# Patient Record
Sex: FEMALE | Race: WHITE | NOT HISPANIC OR LATINO | Employment: OTHER
[De-identification: names, ages, dates, MRNs, and addresses within clinical notes are randomized per-mention and may not be internally consistent; named-entity substitution may affect disease eponyms.]

---

## 2022-02-27 DIAGNOSIS — Z11.59 ENCOUNTER FOR SCREENING FOR OTHER VIRAL DISEASES: Primary | ICD-10-CM

## 2022-03-31 RX ORDER — ROSUVASTATIN CALCIUM 40 MG/1
40 TABLET, COATED ORAL DAILY
COMMUNITY

## 2022-03-31 RX ORDER — CELECOXIB 200 MG/1
200 CAPSULE ORAL EVERY OTHER DAY
COMMUNITY

## 2022-03-31 RX ORDER — MULTIPLE VITAMINS W/ MINERALS TAB 9MG-400MCG
1 TAB ORAL DAILY
COMMUNITY

## 2022-03-31 RX ORDER — CYANOCOBALAMIN 1000 UG/ML
1 INJECTION, SOLUTION INTRAMUSCULAR; SUBCUTANEOUS
COMMUNITY

## 2022-03-31 RX ORDER — ALPRAZOLAM 0.5 MG
0.5 TABLET ORAL PRN
COMMUNITY

## 2022-03-31 RX ORDER — GLIPIZIDE 5 MG/1
5 TABLET, FILM COATED, EXTENDED RELEASE ORAL DAILY
COMMUNITY

## 2022-03-31 RX ORDER — CHLORAL HYDRATE 500 MG
2 CAPSULE ORAL DAILY
COMMUNITY

## 2022-03-31 RX ORDER — LISINOPRIL 10 MG/1
10 TABLET ORAL DAILY
COMMUNITY

## 2022-03-31 RX ORDER — GABAPENTIN 300 MG/1
600 CAPSULE ORAL AT BEDTIME
COMMUNITY

## 2022-03-31 RX ORDER — FLUOXETINE 40 MG/1
40 CAPSULE ORAL DAILY
COMMUNITY

## 2022-04-03 ENCOUNTER — HEALTH MAINTENANCE LETTER (OUTPATIENT)
Age: 72
End: 2022-04-03

## 2022-04-12 NOTE — PHARMACY-ADMISSION MEDICATION HISTORY
Admission medication history interview status for this patient is complete. See [facility] admission navigator for allergy information, prior to admission medications and immunization status.       PTA meds completed by pre-admitting nurse Yoly Barlow and reviewed by pharmacy       Prior to Admission medications    Medication Sig Last Dose Taking? Auth Provider   ALPRAZolam (XANAX) 0.5 MG tablet Take 0.5 mg by mouth as needed for anxiety As needed for air travel  Yes Reported, Patient   Calcium-Magnesium-Vitamin D (CALCIUM 1200+D3 PO) Take 1 tablet by mouth daily  Yes Reported, Patient   celecoxib (CELEBREX) 200 MG capsule Take 200 mg by mouth every other day  Yes Reported, Patient   cyanocobalamin (CYANOCOBALAMIN) 1000 MCG/ML injection Inject 1 mL into the muscle every 30 days  Yes Reported, Patient   ferrous fumarate 65 mg, [place]. FE,-Vitamin C 125 mg (VITRON C)  MG TABS tablet Take 1 tablet by mouth daily  Yes Reported, Patient   fish oil-omega-3 fatty acids 1000 MG capsule Take 2 g by mouth daily  Yes Reported, Patient   FLUoxetine (PROZAC) 40 MG capsule Take 40 mg by mouth daily  Yes Reported, Patient   gabapentin (NEURONTIN) 300 MG capsule Take 600 mg by mouth At Bedtime  Yes Reported, Patient   glipiZIDE (GLUCOTROL XL) 5 MG 24 hr tablet Take 5 mg by mouth daily  Yes Reported, Patient   lisinopril (ZESTRIL) 10 MG tablet Take 10 mg by mouth daily  Yes Reported, Patient   magnesium oxide 200 MG TABS Take 1 tablet by mouth daily  Yes Reported, Patient   metFORMIN (GLUCOPHAGE) 500 MG tablet Take 1,000 mg by mouth 2 times daily (with meals)  Yes Reported, Patient   multivitamin w/minerals (MULTI-VITAMIN) tablet Take 1 tablet by mouth daily  Yes Reported, Patient   rosuvastatin (CRESTOR) 40 MG tablet Take 40 mg by mouth daily  Yes Reported, Patient

## 2022-04-13 ENCOUNTER — APPOINTMENT (OUTPATIENT)
Dept: GENERAL RADIOLOGY | Facility: CLINIC | Age: 72
DRG: 470 | End: 2022-04-13
Attending: PHYSICIAN ASSISTANT
Payer: COMMERCIAL

## 2022-04-13 ENCOUNTER — ANESTHESIA EVENT (OUTPATIENT)
Dept: SURGERY | Facility: CLINIC | Age: 72
DRG: 470 | End: 2022-04-13
Payer: COMMERCIAL

## 2022-04-13 ENCOUNTER — HOSPITAL ENCOUNTER (INPATIENT)
Facility: CLINIC | Age: 72
LOS: 1 days | Discharge: NURSING, REHAB OR LONG-TERM CARE | DRG: 470 | End: 2022-04-14
Attending: ORTHOPAEDIC SURGERY | Admitting: ORTHOPAEDIC SURGERY
Payer: COMMERCIAL

## 2022-04-13 ENCOUNTER — ANESTHESIA (OUTPATIENT)
Dept: SURGERY | Facility: CLINIC | Age: 72
DRG: 470 | End: 2022-04-13
Payer: COMMERCIAL

## 2022-04-13 ENCOUNTER — APPOINTMENT (OUTPATIENT)
Dept: PHYSICAL THERAPY | Facility: CLINIC | Age: 72
DRG: 470 | End: 2022-04-13
Attending: PHYSICIAN ASSISTANT
Payer: COMMERCIAL

## 2022-04-13 DIAGNOSIS — Z96.641 STATUS POST TOTAL HIP REPLACEMENT, RIGHT: Primary | ICD-10-CM

## 2022-04-13 PROBLEM — Z96.649 S/P TOTAL HIP ARTHROPLASTY: Status: ACTIVE | Noted: 2022-04-13

## 2022-04-13 LAB
GLUCOSE BLDC GLUCOMTR-MCNC: 118 MG/DL (ref 70–99)
GLUCOSE BLDC GLUCOMTR-MCNC: 166 MG/DL (ref 70–99)
GLUCOSE BLDC GLUCOMTR-MCNC: 176 MG/DL (ref 70–99)
GLUCOSE BLDC GLUCOMTR-MCNC: 195 MG/DL (ref 70–99)
GLUCOSE BLDC GLUCOMTR-MCNC: 208 MG/DL (ref 70–99)

## 2022-04-13 PROCEDURE — 258N000003 HC RX IP 258 OP 636: Performed by: PHYSICIAN ASSISTANT

## 2022-04-13 PROCEDURE — 250N000011 HC RX IP 250 OP 636: Performed by: ORTHOPAEDIC SURGERY

## 2022-04-13 PROCEDURE — 250N000011 HC RX IP 250 OP 636: Performed by: PHYSICIAN ASSISTANT

## 2022-04-13 PROCEDURE — 258N000001 HC RX 258: Performed by: ORTHOPAEDIC SURGERY

## 2022-04-13 PROCEDURE — 97530 THERAPEUTIC ACTIVITIES: CPT | Mod: GP | Performed by: PHYSICAL THERAPIST

## 2022-04-13 PROCEDURE — 258N000003 HC RX IP 258 OP 636: Performed by: NURSE ANESTHETIST, CERTIFIED REGISTERED

## 2022-04-13 PROCEDURE — 999N000141 HC STATISTIC PRE-PROCEDURE NURSING ASSESSMENT: Performed by: ORTHOPAEDIC SURGERY

## 2022-04-13 PROCEDURE — 370N000017 HC ANESTHESIA TECHNICAL FEE, PER MIN: Performed by: ORTHOPAEDIC SURGERY

## 2022-04-13 PROCEDURE — 250N000011 HC RX IP 250 OP 636: Performed by: NURSE ANESTHETIST, CERTIFIED REGISTERED

## 2022-04-13 PROCEDURE — 99222 1ST HOSP IP/OBS MODERATE 55: CPT | Performed by: INTERNAL MEDICINE

## 2022-04-13 PROCEDURE — 250N000011 HC RX IP 250 OP 636: Performed by: ANESTHESIOLOGY

## 2022-04-13 PROCEDURE — 82962 GLUCOSE BLOOD TEST: CPT

## 2022-04-13 PROCEDURE — 272N000001 HC OR GENERAL SUPPLY STERILE: Performed by: ORTHOPAEDIC SURGERY

## 2022-04-13 PROCEDURE — 97161 PT EVAL LOW COMPLEX 20 MIN: CPT | Mod: GP | Performed by: PHYSICAL THERAPIST

## 2022-04-13 PROCEDURE — 360N000077 HC SURGERY LEVEL 4, PER MIN: Performed by: ORTHOPAEDIC SURGERY

## 2022-04-13 PROCEDURE — 710N000009 HC RECOVERY PHASE 1, LEVEL 1, PER MIN: Performed by: ORTHOPAEDIC SURGERY

## 2022-04-13 PROCEDURE — 250N000009 HC RX 250: Performed by: NURSE ANESTHETIST, CERTIFIED REGISTERED

## 2022-04-13 PROCEDURE — 97116 GAIT TRAINING THERAPY: CPT | Mod: GP | Performed by: PHYSICAL THERAPIST

## 2022-04-13 PROCEDURE — 999N000065 XR PELVIS AD HIP PORTABLE RIGHT 1 VIEW

## 2022-04-13 PROCEDURE — 120N000001 HC R&B MED SURG/OB

## 2022-04-13 PROCEDURE — C1776 JOINT DEVICE (IMPLANTABLE): HCPCS | Performed by: ORTHOPAEDIC SURGERY

## 2022-04-13 PROCEDURE — 250N000013 HC RX MED GY IP 250 OP 250 PS 637: Performed by: PHYSICIAN ASSISTANT

## 2022-04-13 PROCEDURE — 0SR903A REPLACEMENT OF RIGHT HIP JOINT WITH CERAMIC SYNTHETIC SUBSTITUTE, UNCEMENTED, OPEN APPROACH: ICD-10-PCS | Performed by: ORTHOPAEDIC SURGERY

## 2022-04-13 PROCEDURE — 272N000002 HC OR SUPPLY OTHER OPNP: Performed by: ORTHOPAEDIC SURGERY

## 2022-04-13 DEVICE — TRIDENT II TRITANIUM CLUSTERHOLE 52E: Type: IMPLANTABLE DEVICE | Site: HIP | Status: FUNCTIONAL

## 2022-04-13 DEVICE — INSERT ACE 36MM 0DEG X3 723-00-36E: Type: IMPLANTABLE DEVICE | Site: HIP | Status: FUNCTIONAL

## 2022-04-13 DEVICE — IMP STEM FEMORAL HIP STRK ACCOLADE II 132DEG SZ 5 6720-0535: Type: IMPLANTABLE DEVICE | Site: HIP | Status: FUNCTIONAL

## 2022-04-13 DEVICE — IMP HEAD FEMORAL STRK BIOLOX DELTA CERAMIC 36MM +0MM: Type: IMPLANTABLE DEVICE | Site: HIP | Status: FUNCTIONAL

## 2022-04-13 RX ORDER — LIDOCAINE 40 MG/G
CREAM TOPICAL
Status: DISCONTINUED | OUTPATIENT
Start: 2022-04-13 | End: 2022-04-14 | Stop reason: HOSPADM

## 2022-04-13 RX ORDER — HYDROMORPHONE HCL IN WATER/PF 6 MG/30 ML
0.2 PATIENT CONTROLLED ANALGESIA SYRINGE INTRAVENOUS
Status: DISCONTINUED | OUTPATIENT
Start: 2022-04-13 | End: 2022-04-14 | Stop reason: HOSPADM

## 2022-04-13 RX ORDER — AMOXICILLIN 250 MG
1 CAPSULE ORAL 2 TIMES DAILY
Status: DISCONTINUED | OUTPATIENT
Start: 2022-04-13 | End: 2022-04-14 | Stop reason: HOSPADM

## 2022-04-13 RX ORDER — OXYCODONE HYDROCHLORIDE 5 MG/1
5-10 TABLET ORAL EVERY 4 HOURS PRN
Qty: 20 TABLET | Refills: 0 | Status: SHIPPED | OUTPATIENT
Start: 2022-04-13 | End: 2022-04-14

## 2022-04-13 RX ORDER — KETOROLAC TROMETHAMINE 30 MG/ML
INJECTION, SOLUTION INTRAMUSCULAR; INTRAVENOUS PRN
Status: DISCONTINUED | OUTPATIENT
Start: 2022-04-13 | End: 2022-04-13

## 2022-04-13 RX ORDER — TRAMADOL HYDROCHLORIDE 50 MG/1
50 TABLET ORAL EVERY 6 HOURS PRN
Status: DISCONTINUED | OUTPATIENT
Start: 2022-04-13 | End: 2022-04-14 | Stop reason: HOSPADM

## 2022-04-13 RX ORDER — LISINOPRIL 10 MG/1
10 TABLET ORAL DAILY
Status: DISCONTINUED | OUTPATIENT
Start: 2022-04-13 | End: 2022-04-14 | Stop reason: HOSPADM

## 2022-04-13 RX ORDER — NALOXONE HYDROCHLORIDE 0.4 MG/ML
0.2 INJECTION, SOLUTION INTRAMUSCULAR; INTRAVENOUS; SUBCUTANEOUS
Status: DISCONTINUED | OUTPATIENT
Start: 2022-04-13 | End: 2022-04-14 | Stop reason: HOSPADM

## 2022-04-13 RX ORDER — SODIUM CHLORIDE, SODIUM LACTATE, POTASSIUM CHLORIDE, CALCIUM CHLORIDE 600; 310; 30; 20 MG/100ML; MG/100ML; MG/100ML; MG/100ML
INJECTION, SOLUTION INTRAVENOUS CONTINUOUS
Status: DISCONTINUED | OUTPATIENT
Start: 2022-04-13 | End: 2022-04-14 | Stop reason: HOSPADM

## 2022-04-13 RX ORDER — FENTANYL CITRATE 50 UG/ML
25 INJECTION, SOLUTION INTRAMUSCULAR; INTRAVENOUS EVERY 5 MIN PRN
Status: DISCONTINUED | OUTPATIENT
Start: 2022-04-13 | End: 2022-04-13 | Stop reason: HOSPADM

## 2022-04-13 RX ORDER — EPHEDRINE SULFATE 50 MG/ML
INJECTION, SOLUTION INTRAVENOUS PRN
Status: DISCONTINUED | OUTPATIENT
Start: 2022-04-13 | End: 2022-04-13

## 2022-04-13 RX ORDER — DEXTROSE MONOHYDRATE 25 G/50ML
25-50 INJECTION, SOLUTION INTRAVENOUS
Status: DISCONTINUED | OUTPATIENT
Start: 2022-04-13 | End: 2022-04-14 | Stop reason: HOSPADM

## 2022-04-13 RX ORDER — CEFAZOLIN SODIUM/WATER 2 G/20 ML
2 SYRINGE (ML) INTRAVENOUS
Status: COMPLETED | OUTPATIENT
Start: 2022-04-13 | End: 2022-04-13

## 2022-04-13 RX ORDER — GLYCOPYRROLATE 0.2 MG/ML
INJECTION, SOLUTION INTRAMUSCULAR; INTRAVENOUS PRN
Status: DISCONTINUED | OUTPATIENT
Start: 2022-04-13 | End: 2022-04-13

## 2022-04-13 RX ORDER — PROCHLORPERAZINE MALEATE 5 MG
5 TABLET ORAL EVERY 6 HOURS PRN
Status: DISCONTINUED | OUTPATIENT
Start: 2022-04-13 | End: 2022-04-14 | Stop reason: HOSPADM

## 2022-04-13 RX ORDER — HYDRALAZINE HYDROCHLORIDE 20 MG/ML
2.5-5 INJECTION INTRAMUSCULAR; INTRAVENOUS EVERY 10 MIN PRN
Status: DISCONTINUED | OUTPATIENT
Start: 2022-04-13 | End: 2022-04-13 | Stop reason: HOSPADM

## 2022-04-13 RX ORDER — POLYETHYLENE GLYCOL 3350 17 G/17G
17 POWDER, FOR SOLUTION ORAL DAILY
Status: DISCONTINUED | OUTPATIENT
Start: 2022-04-14 | End: 2022-04-14 | Stop reason: HOSPADM

## 2022-04-13 RX ORDER — FENTANYL CITRATE 50 UG/ML
INJECTION, SOLUTION INTRAMUSCULAR; INTRAVENOUS PRN
Status: DISCONTINUED | OUTPATIENT
Start: 2022-04-13 | End: 2022-04-13

## 2022-04-13 RX ORDER — ONDANSETRON 2 MG/ML
4 INJECTION INTRAMUSCULAR; INTRAVENOUS EVERY 30 MIN PRN
Status: DISCONTINUED | OUTPATIENT
Start: 2022-04-13 | End: 2022-04-13 | Stop reason: HOSPADM

## 2022-04-13 RX ORDER — HYDROMORPHONE HCL IN WATER/PF 6 MG/30 ML
0.2 PATIENT CONTROLLED ANALGESIA SYRINGE INTRAVENOUS EVERY 5 MIN PRN
Status: DISCONTINUED | OUTPATIENT
Start: 2022-04-13 | End: 2022-04-13 | Stop reason: HOSPADM

## 2022-04-13 RX ORDER — NALOXONE HYDROCHLORIDE 0.4 MG/ML
0.4 INJECTION, SOLUTION INTRAMUSCULAR; INTRAVENOUS; SUBCUTANEOUS
Status: DISCONTINUED | OUTPATIENT
Start: 2022-04-13 | End: 2022-04-14 | Stop reason: HOSPADM

## 2022-04-13 RX ORDER — TRAMADOL HYDROCHLORIDE 50 MG/1
50 TABLET ORAL EVERY 6 HOURS PRN
Qty: 30 TABLET | Refills: 0 | Status: SHIPPED | OUTPATIENT
Start: 2022-04-13 | End: 2022-04-14

## 2022-04-13 RX ORDER — CELECOXIB 200 MG/1
200 CAPSULE ORAL EVERY OTHER DAY
Status: DISCONTINUED | OUTPATIENT
Start: 2022-04-13 | End: 2022-04-14 | Stop reason: HOSPADM

## 2022-04-13 RX ORDER — ACETAMINOPHEN 325 MG/1
650 TABLET ORAL EVERY 4 HOURS PRN
Qty: 100 TABLET | Refills: 0 | Status: SHIPPED | OUTPATIENT
Start: 2022-04-13

## 2022-04-13 RX ORDER — SODIUM CHLORIDE, SODIUM LACTATE, POTASSIUM CHLORIDE, CALCIUM CHLORIDE 600; 310; 30; 20 MG/100ML; MG/100ML; MG/100ML; MG/100ML
INJECTION, SOLUTION INTRAVENOUS CONTINUOUS PRN
Status: DISCONTINUED | OUTPATIENT
Start: 2022-04-13 | End: 2022-04-13

## 2022-04-13 RX ORDER — AMOXICILLIN 250 MG
1-2 CAPSULE ORAL 2 TIMES DAILY
Qty: 30 TABLET | Refills: 0 | Status: SHIPPED | OUTPATIENT
Start: 2022-04-13

## 2022-04-13 RX ORDER — HYDROXYZINE HYDROCHLORIDE 10 MG/1
10 TABLET, FILM COATED ORAL EVERY 6 HOURS PRN
Status: DISCONTINUED | OUTPATIENT
Start: 2022-04-13 | End: 2022-04-14 | Stop reason: HOSPADM

## 2022-04-13 RX ORDER — ACETAMINOPHEN 325 MG/1
975 TABLET ORAL EVERY 8 HOURS
Status: DISCONTINUED | OUTPATIENT
Start: 2022-04-13 | End: 2022-04-14 | Stop reason: HOSPADM

## 2022-04-13 RX ORDER — OXYCODONE HYDROCHLORIDE 5 MG/1
10 TABLET ORAL EVERY 4 HOURS PRN
Status: DISCONTINUED | OUTPATIENT
Start: 2022-04-13 | End: 2022-04-14 | Stop reason: HOSPADM

## 2022-04-13 RX ORDER — OXYCODONE HYDROCHLORIDE 5 MG/1
5 TABLET ORAL EVERY 4 HOURS PRN
Status: DISCONTINUED | OUTPATIENT
Start: 2022-04-13 | End: 2022-04-14 | Stop reason: HOSPADM

## 2022-04-13 RX ORDER — TRANEXAMIC ACID 650 MG/1
1950 TABLET ORAL ONCE
Status: COMPLETED | OUTPATIENT
Start: 2022-04-13 | End: 2022-04-13

## 2022-04-13 RX ORDER — DEXAMETHASONE SODIUM PHOSPHATE 4 MG/ML
INJECTION, SOLUTION INTRA-ARTICULAR; INTRALESIONAL; INTRAMUSCULAR; INTRAVENOUS; SOFT TISSUE PRN
Status: DISCONTINUED | OUTPATIENT
Start: 2022-04-13 | End: 2022-04-13

## 2022-04-13 RX ORDER — NICOTINE POLACRILEX 4 MG
15-30 LOZENGE BUCCAL
Status: DISCONTINUED | OUTPATIENT
Start: 2022-04-13 | End: 2022-04-14 | Stop reason: HOSPADM

## 2022-04-13 RX ORDER — ONDANSETRON 4 MG/1
4-8 TABLET, ORALLY DISINTEGRATING ORAL EVERY 8 HOURS PRN
Qty: 10 TABLET | Refills: 0 | Status: SHIPPED | OUTPATIENT
Start: 2022-04-13

## 2022-04-13 RX ORDER — GABAPENTIN 300 MG/1
600 CAPSULE ORAL AT BEDTIME
Status: DISCONTINUED | OUTPATIENT
Start: 2022-04-13 | End: 2022-04-14 | Stop reason: HOSPADM

## 2022-04-13 RX ORDER — HYDROMORPHONE HCL IN WATER/PF 6 MG/30 ML
0.4 PATIENT CONTROLLED ANALGESIA SYRINGE INTRAVENOUS
Status: DISCONTINUED | OUTPATIENT
Start: 2022-04-13 | End: 2022-04-14 | Stop reason: HOSPADM

## 2022-04-13 RX ORDER — LIDOCAINE 40 MG/G
CREAM TOPICAL
Status: DISCONTINUED | OUTPATIENT
Start: 2022-04-13 | End: 2022-04-13 | Stop reason: HOSPADM

## 2022-04-13 RX ORDER — PROPOFOL 10 MG/ML
INJECTION, EMULSION INTRAVENOUS PRN
Status: DISCONTINUED | OUTPATIENT
Start: 2022-04-13 | End: 2022-04-13

## 2022-04-13 RX ORDER — ONDANSETRON 4 MG/1
4 TABLET, ORALLY DISINTEGRATING ORAL EVERY 6 HOURS PRN
Status: DISCONTINUED | OUTPATIENT
Start: 2022-04-13 | End: 2022-04-14 | Stop reason: HOSPADM

## 2022-04-13 RX ORDER — ONDANSETRON 4 MG/1
4 TABLET, ORALLY DISINTEGRATING ORAL EVERY 30 MIN PRN
Status: DISCONTINUED | OUTPATIENT
Start: 2022-04-13 | End: 2022-04-13 | Stop reason: HOSPADM

## 2022-04-13 RX ORDER — NEOSTIGMINE METHYLSULFATE 1 MG/ML
VIAL (ML) INJECTION PRN
Status: DISCONTINUED | OUTPATIENT
Start: 2022-04-13 | End: 2022-04-13

## 2022-04-13 RX ORDER — ONDANSETRON 2 MG/ML
4 INJECTION INTRAMUSCULAR; INTRAVENOUS EVERY 6 HOURS PRN
Status: DISCONTINUED | OUTPATIENT
Start: 2022-04-13 | End: 2022-04-14 | Stop reason: HOSPADM

## 2022-04-13 RX ORDER — HYDROXYZINE HYDROCHLORIDE 10 MG/1
10 TABLET, FILM COATED ORAL EVERY 6 HOURS PRN
Qty: 30 TABLET | Refills: 0 | Status: SHIPPED | OUTPATIENT
Start: 2022-04-13

## 2022-04-13 RX ORDER — CEFAZOLIN SODIUM/WATER 2 G/20 ML
2 SYRINGE (ML) INTRAVENOUS SEE ADMIN INSTRUCTIONS
Status: DISCONTINUED | OUTPATIENT
Start: 2022-04-13 | End: 2022-04-13 | Stop reason: HOSPADM

## 2022-04-13 RX ORDER — OXYCODONE HYDROCHLORIDE 5 MG/1
5 TABLET ORAL EVERY 4 HOURS PRN
Status: DISCONTINUED | OUTPATIENT
Start: 2022-04-13 | End: 2022-04-13 | Stop reason: HOSPADM

## 2022-04-13 RX ORDER — BISACODYL 10 MG
10 SUPPOSITORY, RECTAL RECTAL DAILY PRN
Status: DISCONTINUED | OUTPATIENT
Start: 2022-04-13 | End: 2022-04-14 | Stop reason: HOSPADM

## 2022-04-13 RX ORDER — CEFAZOLIN SODIUM 2 G/100ML
2 INJECTION, SOLUTION INTRAVENOUS EVERY 8 HOURS
Status: COMPLETED | OUTPATIENT
Start: 2022-04-13 | End: 2022-04-13

## 2022-04-13 RX ORDER — LABETALOL HYDROCHLORIDE 5 MG/ML
10 INJECTION, SOLUTION INTRAVENOUS
Status: DISCONTINUED | OUTPATIENT
Start: 2022-04-13 | End: 2022-04-13 | Stop reason: HOSPADM

## 2022-04-13 RX ORDER — ONDANSETRON 2 MG/ML
INJECTION INTRAMUSCULAR; INTRAVENOUS PRN
Status: DISCONTINUED | OUTPATIENT
Start: 2022-04-13 | End: 2022-04-13

## 2022-04-13 RX ORDER — ACETAMINOPHEN 325 MG/1
650 TABLET ORAL EVERY 4 HOURS PRN
Status: DISCONTINUED | OUTPATIENT
Start: 2022-04-16 | End: 2022-04-14 | Stop reason: HOSPADM

## 2022-04-13 RX ORDER — LIDOCAINE HYDROCHLORIDE 10 MG/ML
INJECTION, SOLUTION INFILTRATION; PERINEURAL PRN
Status: DISCONTINUED | OUTPATIENT
Start: 2022-04-13 | End: 2022-04-13

## 2022-04-13 RX ORDER — VANCOMYCIN HYDROCHLORIDE 1 G/20ML
INJECTION, POWDER, LYOPHILIZED, FOR SOLUTION INTRAVENOUS PRN
Status: DISCONTINUED | OUTPATIENT
Start: 2022-04-13 | End: 2022-04-13 | Stop reason: HOSPADM

## 2022-04-13 RX ORDER — SODIUM CHLORIDE, SODIUM LACTATE, POTASSIUM CHLORIDE, CALCIUM CHLORIDE 600; 310; 30; 20 MG/100ML; MG/100ML; MG/100ML; MG/100ML
INJECTION, SOLUTION INTRAVENOUS CONTINUOUS
Status: DISCONTINUED | OUTPATIENT
Start: 2022-04-13 | End: 2022-04-13 | Stop reason: HOSPADM

## 2022-04-13 RX ADMIN — ONDANSETRON HYDROCHLORIDE 4 MG: 2 INJECTION, SOLUTION INTRAVENOUS at 08:20

## 2022-04-13 RX ADMIN — EPHEDRINE SULFATE 7.5 MG: 50 INJECTION, SOLUTION INTRAVENOUS at 08:47

## 2022-04-13 RX ADMIN — LIDOCAINE HYDROCHLORIDE 50 MG: 10 INJECTION, SOLUTION INFILTRATION; PERINEURAL at 07:29

## 2022-04-13 RX ADMIN — CEFAZOLIN SODIUM 2 G: 2 INJECTION, SOLUTION INTRAVENOUS at 22:02

## 2022-04-13 RX ADMIN — TRANEXAMIC ACID 1950 MG: 650 TABLET ORAL at 06:43

## 2022-04-13 RX ADMIN — ACETAMINOPHEN 975 MG: 325 TABLET, FILM COATED ORAL at 21:18

## 2022-04-13 RX ADMIN — DEXAMETHASONE SODIUM PHOSPHATE 4 MG: 4 INJECTION, SOLUTION INTRA-ARTICULAR; INTRALESIONAL; INTRAMUSCULAR; INTRAVENOUS; SOFT TISSUE at 07:29

## 2022-04-13 RX ADMIN — PROPOFOL 30 MG: 10 INJECTION, EMULSION INTRAVENOUS at 09:21

## 2022-04-13 RX ADMIN — OXYCODONE HYDROCHLORIDE 5 MG: 5 TABLET ORAL at 19:43

## 2022-04-13 RX ADMIN — SODIUM CHLORIDE, POTASSIUM CHLORIDE, SODIUM LACTATE AND CALCIUM CHLORIDE: 600; 310; 30; 20 INJECTION, SOLUTION INTRAVENOUS at 07:24

## 2022-04-13 RX ADMIN — ACETAMINOPHEN 975 MG: 325 TABLET, FILM COATED ORAL at 15:28

## 2022-04-13 RX ADMIN — GLYCOPYRROLATE 0.4 MG: 0.2 INJECTION, SOLUTION INTRAMUSCULAR; INTRAVENOUS at 09:17

## 2022-04-13 RX ADMIN — FENTANYL CITRATE 50 MCG: 50 INJECTION, SOLUTION INTRAMUSCULAR; INTRAVENOUS at 08:20

## 2022-04-13 RX ADMIN — FENTANYL CITRATE 25 MCG: 50 INJECTION, SOLUTION INTRAMUSCULAR; INTRAVENOUS at 10:39

## 2022-04-13 RX ADMIN — LISINOPRIL 10 MG: 10 TABLET ORAL at 15:30

## 2022-04-13 RX ADMIN — FLUOXETINE 40 MG: 20 CAPSULE ORAL at 15:28

## 2022-04-13 RX ADMIN — METFORMIN HYDROCHLORIDE 1000 MG: 500 TABLET, FILM COATED ORAL at 17:49

## 2022-04-13 RX ADMIN — MIDAZOLAM 2 MG: 1 INJECTION INTRAMUSCULAR; INTRAVENOUS at 07:24

## 2022-04-13 RX ADMIN — SENNOSIDES AND DOCUSATE SODIUM 1 TABLET: 50; 8.6 TABLET ORAL at 19:43

## 2022-04-13 RX ADMIN — KETOROLAC TROMETHAMINE 15 MG: 30 INJECTION, SOLUTION INTRAMUSCULAR at 08:59

## 2022-04-13 RX ADMIN — EPHEDRINE SULFATE 5 MG: 50 INJECTION, SOLUTION INTRAVENOUS at 08:52

## 2022-04-13 RX ADMIN — PROPOFOL 150 MG: 10 INJECTION, EMULSION INTRAVENOUS at 07:29

## 2022-04-13 RX ADMIN — FENTANYL CITRATE 100 MCG: 50 INJECTION, SOLUTION INTRAMUSCULAR; INTRAVENOUS at 07:29

## 2022-04-13 RX ADMIN — GABAPENTIN 600 MG: 300 CAPSULE ORAL at 21:18

## 2022-04-13 RX ADMIN — HYDROMORPHONE HYDROCHLORIDE 0.5 MG: 1 INJECTION, SOLUTION INTRAMUSCULAR; INTRAVENOUS; SUBCUTANEOUS at 09:45

## 2022-04-13 RX ADMIN — NEOSTIGMINE METHYLSULFATE 3 MG: 1 INJECTION, SOLUTION INTRAVENOUS at 09:17

## 2022-04-13 RX ADMIN — FENTANYL CITRATE 100 MCG: 50 INJECTION, SOLUTION INTRAMUSCULAR; INTRAVENOUS at 07:54

## 2022-04-13 RX ADMIN — Medication 2 G: at 07:24

## 2022-04-13 RX ADMIN — HYDROMORPHONE HYDROCHLORIDE 0.5 MG: 1 INJECTION, SOLUTION INTRAMUSCULAR; INTRAVENOUS; SUBCUTANEOUS at 09:36

## 2022-04-13 RX ADMIN — ROCURONIUM BROMIDE 50 MG: 50 INJECTION, SOLUTION INTRAVENOUS at 07:29

## 2022-04-13 RX ADMIN — SODIUM CHLORIDE, POTASSIUM CHLORIDE, SODIUM LACTATE AND CALCIUM CHLORIDE: 600; 310; 30; 20 INJECTION, SOLUTION INTRAVENOUS at 09:24

## 2022-04-13 RX ADMIN — ASPIRIN 325 MG: 325 TABLET, COATED ORAL at 15:29

## 2022-04-13 RX ADMIN — HYDROMORPHONE HYDROCHLORIDE 0.5 MG: 1 INJECTION, SOLUTION INTRAMUSCULAR; INTRAVENOUS; SUBCUTANEOUS at 09:05

## 2022-04-13 RX ADMIN — OXYCODONE HYDROCHLORIDE 5 MG: 5 TABLET ORAL at 15:29

## 2022-04-13 RX ADMIN — FENTANYL CITRATE 25 MCG: 50 INJECTION, SOLUTION INTRAMUSCULAR; INTRAVENOUS at 10:22

## 2022-04-13 RX ADMIN — CELECOXIB 200 MG: 200 CAPSULE ORAL at 15:29

## 2022-04-13 RX ADMIN — Medication 1 TABLET: at 15:30

## 2022-04-13 RX ADMIN — CEFAZOLIN SODIUM 2 G: 2 INJECTION, SOLUTION INTRAVENOUS at 15:36

## 2022-04-13 RX ADMIN — GLYCOPYRROLATE 0.2 MG: 0.2 INJECTION, SOLUTION INTRAMUSCULAR; INTRAVENOUS at 07:29

## 2022-04-13 ASSESSMENT — ACTIVITIES OF DAILY LIVING (ADL)
ADLS_ACUITY_SCORE: 10
ADLS_ACUITY_SCORE: 10
ADLS_ACUITY_SCORE: 6
ADLS_ACUITY_SCORE: 8
ADLS_ACUITY_SCORE: 6
ADLS_ACUITY_SCORE: 8
ADLS_ACUITY_SCORE: 6

## 2022-04-13 NOTE — ANESTHESIA PROCEDURE NOTES
Airway       Patient location during procedure: OR       Procedure Start/Stop Times: 4/13/2022 7:32 AM  Staff -        Anesthesiologist:  Dalia Jorge APRN CRNA       Performed By: CRNA  Consent for Airway        Urgency: elective  Indications and Patient Condition       Indications for airway management: sridhar-procedural       Induction type:intravenous       Mask difficulty assessment: 1 - vent by mask    Final Airway Details       Final airway type: endotracheal airway       Successful airway: ETT - single and Oral  Endotracheal Airway Details        ETT size (mm): 7.0       Cuffed: yes       Successful intubation technique: direct laryngoscopy       DL Blade Type: Dove 2       Grade View of Cords: 1       Adjucts: stylet       Position: Right       Bite block used: Soft    Post intubation assessment        Placement verified by: capnometry, equal breath sounds and chest rise        Number of attempts at approach: 1       Secured with: plastic tape       Ease of procedure: easy       Dentition: Intact    Medication(s) Administered   Medication Administration Time: 4/13/2022 7:32 AM

## 2022-04-13 NOTE — INTERVAL H&P NOTE
"I have reviewed the surgical (or preoperative) H&P that is linked to this encounter, and examined the patient. There are no significant changes    Clinical Conditions Present on Arrival:  Clinically Significant Risk Factors Present on Admission                   # Severe Obesity: Estimated body mass index is 42.99 kg/m  as calculated from the following:    Height as of this encounter: 1.65 m (5' 4.96\").    Weight as of this encounter: 117 kg (258 lb).       "

## 2022-04-13 NOTE — PROGRESS NOTES
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   Self-Care   Usual Activity Tolerance good   Current Activity Tolerance moderate   Equipment Currently Used at Home cane, quad;walker, rolling;grab bar, toilet;grab bar, tub/shower;tub bench;raised toilet seat   Fall history within last six months no   General Information   Onset of Illness/Injury or Date of Surgery 04/13/22   Referring Physician Javed Gee PA-C   Patient/Family Therapy Goals Statement (PT) Improve functional mobility   Pertinent History of Current Problem (include personal factors and/or comorbidities that impact the POC) Pt is a 70 y/o female POD0 R MARY   Existing Precautions/Restrictions no hip IR;no hip ADD past midline;90 degree hip flexion;weight bearing   Weight-Bearing Status - RLE weight-bearing as tolerated   Cognition   Affect/Mental Status (Cognition) WFL   Orientation Status (Cognition) oriented x 4   Pain Assessment   Patient Currently in Pain Yes, see Vital Sign flowsheet   Range of Motion (ROM)   Range of Motion ROM deficits secondary to surgical procedure;ROM is WFL   Strength (Manual Muscle Testing)   Strength (Manual Muscle Testing) Able to perform L SLR;Able to perform R SLR;strength is WFL   Strength Comments General weakess   Bed Mobility   Bed Mobility supine-sit;sit-supine   Supine-Sit [place] (Bed Mobility) minimum assist (75% patient effort)   Transfers   Comment, (Transfers) Sit<>stand with FWW and CGA   Gait/Stairs (Locomotion)   [place] Level (Gait) contact guard   Assistive Device (Gait) walker,  front-wheeled   Distance in Feet (Required for LE Total Joints) 90   Balance   Balance Comments Good seated and standing balance with UE support   Clinical Impression   Criteria for Skilled Therapeutic Intervention Yes, treatment indicated   PT Diagnosis (PT) Impaired functional mobility   Influenced by the following impairments Decreased ROM, Strength, and increased pain   Functional limitations due to impairments Impaired bed mobility, transfers, gait and stiars   Clinical Presentation (PT Evaluation Complexity) Stable/Uncomplicated   Clinical Presentation Rationale PMHx, social support, PLOF   Clinical Decision Making (Complexity) low complexity   Planned Therapy Interventions (PT) balance training;bed mobility training;cryotherapy;gait training;neuromuscular re-education;patient/family education;ROM (range of motion);stair training;strengthening;transfer training   Anticipated Equipment Needs at Discharge (PT) walker, rolling   Risk & Benefits of therapy have been explained evaluation/treatment results reviewed;care plan/treatment goals reviewed;risks/benefits reviewed;current/potential barriers reviewed;participants voiced agreement with care plan;participants included;patient;daughter   PT Discharge Planning   PT Discharge Recommendation (DC Rec)   (defer to ortho)   PT Rationale for DC Rec Pt is mobilizing significantly below baseline. Pt requiring [place] for bed mobility and A x 1 for transfers and gait. Anticipating pt will require Ax 1 prior to d/c home. 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   PT Brief overview of current status A x 1 FWW   Total Evaluation Time   Total Evaluation Time (Minutes) 5   Physical Therapy Goals   PT Frequency Daily   PT Predicted Duration/Target Date for Goal Attainment 04/15/22   PT Goals Transfers;Gait;Bed Mobility;Stairs   PT: Bed Mobility Modified independent;Supine to/from sit   PT: Transfers Modified  independent;Sit to/from stand;Assistive device   PT: Gait Modified independent;Assistive device;100 feet   PT: Stairs Minimal assist;Supervision/stand-by assist;3 stairs;Rail on right

## 2022-04-13 NOTE — PLAN OF CARE
Goal Outcome Evaluation:        Pt arrived to floor around 1200, transferred to bed via hovermat. Dtr and friend at bedside. Up to BSC- voided  200 ml. Prn oxy for pain management. Pt is an HOPD so intention was for her to discharge home tomorrow morning with family assistance, but there is not family able to help her and she is her husbands care giver. So family and pt would like a TCU.

## 2022-04-13 NOTE — ANESTHESIA PREPROCEDURE EVALUATION
Anesthesia Pre-Procedure Evaluation    Patient: Radha Oliver   MRN: [id number] : 1950        Procedure : Procedure(s):  Right posterior approach total hip arthroplasty          Past Medical History:   Diagnosis Date     Arthritis      Depression      Diabetes (H)      Heart murmur      Hypertension      Sleep apnea     CPAP      Past Surgical History:   Procedure Laterality Date     BARIATRIC SURGERY       BREAST SURGERY      breast biopsy      SECTION       CHOLECYSTECTOMY       ENT SURGERY      tonsillectomy     EYE SURGERY Bilateral     blepharoplasty     EYE SURGERY Bilateral     cataracts     GASTRIC BYPASS       GYN SURGERY      D&C x2     GYN SURGERY      tubal     ORTHOPEDIC SURGERY      foot surgery     ORTHOPEDIC SURGERY Right     hammer toe x3     URETERAL STENT PLACEMENT       VITRECTOMY PARSPLANA  2021      Allergies   Allergen Reactions     Dust Mites       Social History     Tobacco Use     Smoking status: Former Smoker     Quit date:      Years since quittin.3     Smokeless tobacco: Never Used   Substance Use Topics     Alcohol use: Yes     Comment: 1/week during summer      Wt Readings from Last 1 Encounters:   22 117 kg (258 lb)        Anesthesia Evaluation            ROS/MED HX  ENT/Pulmonary:     (+) sleep apnea, moderate, uses CPAP,     Neurologic:  - neg neurologic ROS     Cardiovascular:     (+) hypertension-----valvular problems/murmurs     METS/Exercise Tolerance:     Hematologic:       Musculoskeletal:   (+) arthritis,     GI/Hepatic:  - neg GI/hepatic ROS     Renal/Genitourinary:  - neg Renal ROS     Endo:     (+) type II DM,     Psychiatric/Substance Use:     (+) psychiatric history depression     Infectious Disease:  - neg infectious disease ROS     Malignancy:  - neg malignancy ROS     Other:  - neg other ROS   (-) Any chance pregnant       Physical Exam    Airway        Mallampati: III   TM distance: > 3 FB   Neck ROM: full   Mouth opening: > 3  cm    Respiratory Devices and Support         Dental  no notable dental history         Cardiovascular   cardiovascular exam normal          Pulmonary   pulmonary exam normal                OUTSIDE LABS:  CBC: No results found for: WBC, HGB, HCT, PLT  BMP:   Lab Results   Component Value Date     (H) 04/13/2022     COAGS: No results found for: PTT, INR, FIBR  POC: No results found for: BGM, HCG, HCGS  HEPATIC: No results found for: ALBUMIN, PROTTOTAL, ALT, AST, GGT, ALKPHOS, BILITOTAL, BILIDIRECT, ANTON  OTHER: No results found for: PH, LACT, A1C, BONNIE, PHOS, MAG, LIPASE, AMYLASE, TSH, T4, T3, CRP, SED    Anesthesia Plan    ASA Status:  3      Anesthesia Type: General.     - Airway: ETT   Induction: Propofol, Intravenous.   Maintenance: Balanced.        Consents    Anesthesia Plan(s) and associated risks, benefits, and realistic alternatives discussed. Questions answered and patient/representative(s) expressed understanding.    - Discussed:     - Discussed with:  Patient      - Extended Intubation/Ventilatory Support Discussed: No.      - Patient is DNR/DNI Status: No    Use of blood products discussed: Yes.     - Discussed with: Patient.     - Consented: consented to blood products            Reason for refusal: other.     Postoperative Care    Pain management: IV analgesics.   PONV prophylaxis: Ondansetron (or other 5HT-3), Dexamethasone or Solumedrol     Comments:                Eric Harrison MD

## 2022-04-13 NOTE — OP NOTE
[facility]  Operative Note    Posterior Total Hip Arthroplasty -     Radha Oliver MRN# [id number]   YOB: 1950  Procedure Date:  4/13/2022  Age: 71 year old       PREOPERATIVE DIAGNOSIS:    1.  Degenerative osteoarthritis, right hip.  2.  Morbid obesity BMI 44    POSTOPERATIVE DIAGNOSIS:    1.  Degenerative osteoarthritis, right hip.  2.  Morbid obesity BMI 44    PROCEDURE PERFORMED:  Right total hip arthroplasty.  Posterior lateral piriformis sparing approach.    SURGEON:  Flaco Wong M.D.    FIRST ASSISTANT:  Javed Gee PA-C, whose was critical for positioning, retraction during exposure, placement of implants, and closure.    ANESTHESIA:  General    INDICATIONS:  Radha Oliver  is a71 year old-year-old with severe disabling degenerative osteoarthritis of right hip.  Discussed both operative and nonoperative management.  Risks of surgery discussed included but not limited to bleeding, infection, damage to surrounding neurovascular structures, leg length inequality, dislocation, periprosthetic fracture, need for revision surgery, blood clots, pulmonary embolus, stroke, anesthetic complications and even death.  No guarantees given or implied. Patient thoughtfully acknowledges risks and wishes to proceed.  Brought to surgery for right total hip arthroplasty.    IMPLANTS:  [employer] Accolade II size 5 std 132 offset neck, size 52 Trident II cup, neutral liner, and +0 mm 36 mm Biolox head    PROCEDURE:  After obtaining informed surgical consent, and marking patient operative site the patient was brought to the operating room.  Tranexamic acid 1 g given prior to surgery and additional gram at closure.  2 grams of Ancef was administered intravenously within one hour prior to surgery and will be discontinued within 24 hours.  General anesthetic.  Placed in the lateral decubitus position with axillary roll and all prominences well padded.  Chlorohexidine prescrub and the right hip  was prepped with Chloroprep and draped in the usual sterile fashion.  A posterolateral incision was made.  Dissection was carried through the IT band and tensor fascia.  A leg length suture was placed.  The external rotators and capsule were identified, tagged, divided, and preserved for later repair.  Piriformis sparred.  The hip was dislocated.  Severe degenerative osteoarthritis.  The femoral head and neck were resected approximately 15mm proximal to lesser trochanter.  The acetabulum was exposed and advanced osteoarthritis present.  A labrectomy was performed.  The pulvinar was excised.  The pulvinar was excised.  The acetabulum was sequentially reamed from 46 to 52 mm.  A 52 mm solid back Tritanium cup was implanted with slightly increased anteversion reduced pelvic abduction of approximately 40 degrees. Solid and rocked the pelvis.  Trial neutral liner placed.      The femoral canal was then opened with box osteotome, canal finder, and then lateralizer.  Sequentially broached to accept a size #  Accolade 2 #5 stem trial.  After multiple trial reductions, leg lengths were equal by palpation and leg length suture.   Appropriate shuck.  The hip was stable throughout a full range of motion using a +0 x 36 mm head including full extension external rotation, sleeping position, and flexed to 90 deg with over 80 degrees internal rotation. Flat liner was snapped into position.  The stem was implanted in an approximately 5 degrees of increased anteversion as trialed for stability.  The  Biolox head was implanted after stability exam repeated on cleaned and dried masters taper.  The hip was relocated.  The wound was was washed with Rush betadine protocol and then irrigated with antibiotic irrigating solution.  The rotators and capsule were reattached to the trochanter through drill holes.  The wound was then irrigated thoroughly and closed in layers over drained with #1 Vicryl, 2-0 Vicryl, 4-0 Monocryl and exofin glue.  A  sterile dressing was applied.  There were no intraoperative complications.  Blood loss was 300 cc.  Sponge and needle counts were correct and the patient was returned to the recovery room in stable condition.    Post Op Plan:  1.)  WBAT with Posterior hip precautions  2.)  Ancef x 24 hours  3.)  DVT prophylaxis SCDs with ASA EC 325mg PO qday x 6 weeks   4.)  Keep dressing c/d/i x 2 weeks, OK to shower  5.)  Hip abduction pillow  6.)  PACU x-rays    Follow Up:  1.)  2 week wound check with AP pelvis and cross table lateral hip  2.)  8 weeks with AP pelvis and cross table lateral hip    [facility]  [facility]    Monday 1-5 PM  and Thursday 7:30-12:00 AM\  [address] [place] [address]  [phone]    [phone]    Or    Tuesday 7:30-5 PM  [address] [facility] [address]  [place], [place]      Flaco Wong M.D.

## 2022-04-13 NOTE — LETTER
ALDO RaoW, LSW  Inpatient Care Coordination  Ortho Unit, [facility]  [phone]

## 2022-04-13 NOTE — ANESTHESIA CARE TRANSFER NOTE
Patient: Radha Oliver    Procedure: Procedure(s):  Right posterior approach total hip arthroplasty       Diagnosis: Osteoarthritis [M19.90]  Diagnosis Additional Information: No value filed.    Anesthesia Type:   General     Note:    Oropharynx: oropharynx clear of all foreign objects and spontaneously breathing  Level of Consciousness: awake  Oxygen Supplementation: face mask    Independent Airway: airway patency satisfactory and stable  Dentition: dentition unchanged  Vital Signs Stable: post-procedure vital signs reviewed and stable  Report to RN Given: handoff report given  Patient transferred to: PACU  Comments: Report and signed off to RN in PACU.  Good Resps, skin pink, VSS, O2 via face tent.  Handoff Report: Identifed the Patient, Identified the Reponsible Provider, Reviewed the pertinent medical history, Discussed the surgical course, Reviewed Intra-OP anesthesia mangement and issues during anesthesia, Set expectations for post-procedure period and Allowed opportunity for questions and acknowledgement of understanding      Vitals:  Vitals Value Taken Time   /93 04/13/22 0945   Temp     Pulse 91 04/13/22 0947   Resp 15 04/13/22 0947   SpO2 100 % 04/13/22 0947   Vitals shown include unvalidated device data.    Electronically Signed By: PARVEEN Badillo CRNA  April 13, 2022  9:49 AM

## 2022-04-13 NOTE — CONSULTS
Consult Date: 04/13/2022    REASON FOR CONSULTATION:  Postoperative medical care.    HISTORY OF PRESENT ILLNESS:  Ms. Oliver is a 71-year-old female with a history of hypertension, diabetes mellitus, and right hip osteoarthritis.  She was admitted following elective right total hip arthroplasty performed by Dr. Wong of Orthopedic Surgery earlier today.  Based on my chart review, the procedure appeared to go well.  Estimated blood loss was 300 mL.  The patient is currently resting comfortably on the medical-surgical floor post-surgery.    PAST MEDICAL HISTORY:    1.  Hypertension.  2.  Depression.  3.  Gastric bypass history, distant, performed 17 years ago.  4.  Obesity.  5.  Diabetes mellitus, on oral medication.  She reports her most recent hemoglobin A1c was less than 7, and blood sugars at home are in the 100-140 range normally.    CURRENT OUTPATIENT MEDICATIONS:    1.  Glucotrol-XL 5 mg daily.  2.  Celebrex.  3.  Iron therapy.  4.  Prozac 40 mg daily.  5.  Gabapentin 600 mg at bedtime.  6.  Lisinopril 10 mg daily.  7.  Metformin 1000 mg twice a day.  8.  Xanax as needed for anxiety.  9.  Multivitamin.  10.  Crestor 40 mg daily.  11.  Fish oil.    ALLERGIES:  NO KNOWN DRUG ALLERGIES.    FAMILY HISTORY:  Reviewed.  Nothing contributory to this admission.    SOCIAL HISTORY:  The patient is a distant smoker, having quit 3 decades ago.  She does not drink alcohol.  She lives at home with her , and she is the primary caregiver for her , who is debilitated with congestive heart failure.    REVIEW OF SYSTEMS:  See HPI for details.  Comprehensive greater than 10-point review of systems is otherwise negative besides that detailed above.    PHYSICAL EXAMINATION:    VITAL SIGNS:  Blood pressure is currently 155/84 with a heart rate of 70.  No fever.  Saturation 97% on room air.  GENERAL:  The patient appears nontoxic.  No acute distress.  Appears awake, alert and oriented x 3.  Mood and affect are  normal and appropriate.  She has 2 visitors present, including her sister and her daughter, whom I spoke with HEENT:  Head is atraumatic.  Sclerae white.  Eyelids normal.  Conjunctivae normal.  Extraocular movements are intact.  NECK:  Supple.  No cervical or supraclavicular lymphadenopathy.  CARDIOVASCULAR:  Regular rate and rhythm.  No significant murmurs.  No lower extremity edema.  LUNGS:  Clear to auscultation bilaterally.  No intercostal retractions.  No intercostal retractions.  No conversational dyspnea.  ABDOMEN:  Nontender, nondistended, soft, no masses, no organomegaly.  EXTREMITIES:  Show no edema.  SKIN:  Reveals no rashes.  No jaundice.  Skin is dry to touch.  Right hip incision covered with dressing.  NEUROLOGIC:  Cranial nerves 2-12 are intact.  Moves all extremities appropriately as able in the setting of a right hip surgery earlier today.  Right foot sensation is intact to touch.  Right foot is mobile and warm to touch.  PSYCHIATRIC:  Awake, alert and oriented x 3.  Mood and affect are normal and appropriate.    LABORATORY AND IMAGING DATA:  Postoperative glucose levels have been in the 160-200 range.    I reviewed preoperative labs in Care Everywhere.  Basic metabolic panel performed 03/25/2022 was normal.  CBC performed from the same day was also normal.  Hemoglobin A1c from approximately 1 month ago was 6.8.    IMPRESSION AND PLAN:  Ms. Oliver is a 71-year-old female with a history of hypertension, diabetes mellitus, on oral medication, and right hip osteoarthritis.  She was admitted today following elective right total hip arthroplasty.    1.  Right hip osteoarthritis, status post elective right total hip arthroplasty:  Postoperative course and DVT prophylaxis per Orthopedic Surgery.  The patient is currently resting comfortably.  She anticipates working with Physical Therapy later today and again tomorrow.  2.  Diabetes mellitus, on oral medication at home.  Most recent A1c was less than 7.   Metformin has been resumed.  If oral intake adequate tomorrow, can also resume Glucotrol pending her blood sugar results.  I did adjust her insulin sliding scale that had been ordered postoperatively.  3.  Hypertension history:  Lisinopril has been resumed.  Will have parameters to avoid giving if the patient has hypotension.    Appreciate hospitalist consultation on this patient.  We will continue to follow while she is in the hospital.    Edward Cole MD        D: 2022   T: 2022   MT: AYLA    Name:     CAMACHO PARKJossie  MRN:      [id number]        Account:      [id number]   :      1950           Consult Date: 2022     Document: [id number]

## 2022-04-13 NOTE — PROGRESS NOTES
SPIRITUAL HEALTH SERVICES  [facility]  PRE-SURGERY VISIT    Pre-surgical visit with pt.  Provided spiritual support, prayer.   Oriented to Spiritual Health Services and availability for emotional/spiritual support during hospital stay.         Zay Louis MA  Staff   Pager: [phone]  Phone: [phone]

## 2022-04-13 NOTE — ANESTHESIA POSTPROCEDURE EVALUATION
Patient: Radha Oliver    Procedure: Procedure(s):  Right posterior approach total hip arthroplasty       Anesthesia Type:  General    Note:     Postop Pain Control: Uneventful            Sign Out: Well controlled pain   PONV: No   Neuro/Psych: Uneventful            Sign Out: Acceptable/Baseline neuro status   Airway/Respiratory: Uneventful            Sign Out: Acceptable/Baseline resp. status   CV/Hemodynamics: Uneventful            Sign Out: Acceptable CV status; No obvious hypovolemia; No obvious fluid overload   Other NRE: NONE   DID A NON-ROUTINE EVENT OCCUR? No           Last vitals:  Vitals Value Taken Time   /71 04/13/22 1155   Temp     Pulse 83 04/13/22 1211   Resp 16 04/13/22 1211   SpO2 95 % 04/13/22 1211   Vitals shown include unvalidated device data.    Electronically Signed By: Eric Harrison MD  April 13, 2022  2:49 PM

## 2022-04-14 ENCOUNTER — APPOINTMENT (OUTPATIENT)
Dept: PHYSICAL THERAPY | Facility: CLINIC | Age: 72
DRG: 470 | End: 2022-04-14
Attending: PHYSICIAN ASSISTANT
Payer: COMMERCIAL

## 2022-04-14 ENCOUNTER — APPOINTMENT (OUTPATIENT)
Dept: OCCUPATIONAL THERAPY | Facility: CLINIC | Age: 72
DRG: 470 | End: 2022-04-14
Attending: PHYSICIAN ASSISTANT
Payer: COMMERCIAL

## 2022-04-14 VITALS
TEMPERATURE: 97 F | BODY MASS INDEX: 42.99 KG/M2 | OXYGEN SATURATION: 97 % | DIASTOLIC BLOOD PRESSURE: 64 MMHG | SYSTOLIC BLOOD PRESSURE: 109 MMHG | HEART RATE: 62 BPM | RESPIRATION RATE: 16 BRPM | WEIGHT: 258 LBS | HEIGHT: 65 IN

## 2022-04-14 LAB
GLUCOSE BLD-MCNC: 149 MG/DL (ref 70–99)
GLUCOSE BLDC GLUCOMTR-MCNC: 139 MG/DL (ref 70–99)
GLUCOSE BLDC GLUCOMTR-MCNC: 140 MG/DL (ref 70–99)
HGB BLD-MCNC: 10.8 G/DL (ref 11.7–15.7)

## 2022-04-14 PROCEDURE — 97116 GAIT TRAINING THERAPY: CPT | Mod: GP | Performed by: PHYSICAL THERAPIST

## 2022-04-14 PROCEDURE — 97535 SELF CARE MNGMENT TRAINING: CPT | Mod: GO

## 2022-04-14 PROCEDURE — 36415 COLL VENOUS BLD VENIPUNCTURE: CPT | Performed by: ORTHOPAEDIC SURGERY

## 2022-04-14 PROCEDURE — 250N000013 HC RX MED GY IP 250 OP 250 PS 637: Performed by: INTERNAL MEDICINE

## 2022-04-14 PROCEDURE — 97530 THERAPEUTIC ACTIVITIES: CPT | Mod: GP | Performed by: PHYSICAL THERAPIST

## 2022-04-14 PROCEDURE — 99231 SBSQ HOSP IP/OBS SF/LOW 25: CPT | Performed by: INTERNAL MEDICINE

## 2022-04-14 PROCEDURE — 250N000013 HC RX MED GY IP 250 OP 250 PS 637: Performed by: PHYSICIAN ASSISTANT

## 2022-04-14 PROCEDURE — 97165 OT EVAL LOW COMPLEX 30 MIN: CPT | Mod: GO

## 2022-04-14 PROCEDURE — 85018 HEMOGLOBIN: CPT | Performed by: PHYSICIAN ASSISTANT

## 2022-04-14 PROCEDURE — 82947 ASSAY GLUCOSE BLOOD QUANT: CPT | Performed by: ORTHOPAEDIC SURGERY

## 2022-04-14 RX ORDER — TRAMADOL HYDROCHLORIDE 50 MG/1
50 TABLET ORAL EVERY 6 HOURS PRN
Qty: 30 TABLET | Refills: 0 | Status: SHIPPED | OUTPATIENT
Start: 2022-04-14

## 2022-04-14 RX ORDER — OXYCODONE HYDROCHLORIDE 5 MG/1
5-10 TABLET ORAL EVERY 4 HOURS PRN
Qty: 20 TABLET | Refills: 0 | Status: SHIPPED | OUTPATIENT
Start: 2022-04-14

## 2022-04-14 RX ADMIN — ACETAMINOPHEN 975 MG: 325 TABLET, FILM COATED ORAL at 14:04

## 2022-04-14 RX ADMIN — HYDROXYZINE HYDROCHLORIDE 10 MG: 10 TABLET ORAL at 10:36

## 2022-04-14 RX ADMIN — Medication 1 TABLET: at 08:33

## 2022-04-14 RX ADMIN — POLYETHYLENE GLYCOL 3350 17 G: 17 POWDER, FOR SOLUTION ORAL at 08:31

## 2022-04-14 RX ADMIN — OXYCODONE HYDROCHLORIDE 5 MG: 5 TABLET ORAL at 08:31

## 2022-04-14 RX ADMIN — FLUOXETINE 40 MG: 20 CAPSULE ORAL at 08:32

## 2022-04-14 RX ADMIN — SENNOSIDES AND DOCUSATE SODIUM 1 TABLET: 50; 8.6 TABLET ORAL at 08:33

## 2022-04-14 RX ADMIN — ASPIRIN 325 MG: 325 TABLET, COATED ORAL at 08:33

## 2022-04-14 RX ADMIN — OXYCODONE HYDROCHLORIDE 5 MG: 5 TABLET ORAL at 14:05

## 2022-04-14 RX ADMIN — OXYCODONE HYDROCHLORIDE 5 MG: 5 TABLET ORAL at 01:41

## 2022-04-14 RX ADMIN — ACETAMINOPHEN 975 MG: 325 TABLET, FILM COATED ORAL at 06:38

## 2022-04-14 RX ADMIN — METFORMIN HYDROCHLORIDE 1000 MG: 500 TABLET, FILM COATED ORAL at 08:32

## 2022-04-14 ASSESSMENT — ACTIVITIES OF DAILY LIVING (ADL)
ADLS_ACUITY_SCORE: 8
DEPENDENT_IADLS:: INDEPENDENT
ADLS_ACUITY_SCORE: 8

## 2022-04-14 NOTE — PLAN OF CARE
Physical Therapy Discharge Summary    Reason for therapy discharge:    Discharged to transitional care facility.    Progress towards therapy goal(s). See goals on Care Plan in [facility] electronic health record for goal details.  Goals not met.  Barriers to achieving goals:   discharge from facility.    Therapy recommendation(s):    Defer to ortho

## 2022-04-14 NOTE — PROGRESS NOTES
Care Management Discharge Note    Discharge Date: 04/14/2022 @ 1530    Discharge Disposition: TCU - [facility]    Discharge Transportation: [employer] w/c    Private pay costs discussed: transportation costs    PAS Confirmation Code:  [id number]  Patient/family educated on Medicare website which has current facility and service quality ratings:  yes    Education Provided on the Discharge Plan:  yes  Persons Notified of Discharge Plans: pt, pt's dtr Kristy, nursing, MD/ortho team, Trina in admissions at TCU  Patient/Family in Agreement with the Plan: yes    Handoff Referral Completed: No    Additional Information:  Pt has been accepted to [facility] for admission today.     SW obtained [facility] insurance authorization for TCU stay:  Auth #: [id number]  Dates: 4/14-4/23/2022    Met with pt and pt's dtr Kristy to provide update. Both are agreeable to accepting facility. Discussed transportation and pt/Kristy request medical transport. [facility] $81.80 for base rate and $5.26 per mile to the destination. Pt/Kristy verbalized understanding and are agreeable.     CHELITA arranged [employer] w/c ride for today at 1530.     PAS created on: 4/14/2022 12:58 PM  Your confirmation number is:  [id number]    Will await completed SNF orders and fax them to [phone] per admissions Trina's request.     ADDENDUM @ 1269:    Received correct discharge orders. Faxed to facility.     GRACIELA Rao, LSW  Inpatient Care Coordination  Ortho Unit, [facility]  [phone]    GRACIELA Rios

## 2022-04-14 NOTE — PROGRESS NOTES
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   Self-Care   Usual Activity Tolerance good   Current Activity Tolerance moderate   Equipment Currently Used at Home cane, quad;walker, rolling;grab bar, toilet;grab bar, tub/shower;tub bench;raised toilet seat  (sock aid. reacher. toilet hygiene aide. nonslip tub mat)   Fall history within last six months no   Activity/Exercise/Self-Care Comment At baseline pt is independent in self-cares  (Has tub/shower)   Instrumental Activities of Daily Living (IADL)   Previous Responsibilities meal prep;housekeeping;laundry;driving;shopping;medication management;finances   IADL Comments cares for 5 lb dog   General Information   Onset of Illness/Injury or Date of Surgery 04/13/22   Referring Physician Javed Gee PA-C   Patient/Family Therapy Goal Statement (OT) Improve independence   Additional Occupational Profile Info/Pertinent History of Current Problem S/p Right posterior approach total hip arthroplasty   Existing Precautions/Restrictions fall;90 degree hip flexion;no hip IR;no hip ADD past midline   Cognitive Status Examination   Affect/Mental Status (Cognitive) WFL   Visual Perception   Visual Impairment/Limitations corrective lenses full-time   Sensory   Sensory Comments Pt reports baseline neuropathy in feet   Pain Assessment   Patient Currently in Pain Yes, see Vital Sign flowsheet   Bed Mobility   Bed Mobility supine-sit   Supine-Sit [place] (Bed Mobility) minimum assist (75% patient effort)   Assistive Device (Bed Mobility)   (walker as bed rail)   Transfers   Transfers toilet transfer;sit-stand transfer   Sit-Stand Transfer   Sit-Stand [place] (Transfers) contact guard   Toilet Transfer   Type  (Toilet Transfer) sit-stand;stand-sit   [place] Level (Toilet Transfer) minimum assist (75% patient effort)   Activities of Daily Living   BADL Assessment/Intervention lower body dressing;toileting;bathing   Bathing Assessment/Intervention   [place] Level (Bathing) moderate assist (50% patient effort)  (per clinical judgment)   Lower Body Dressing Assessment/Training   [place] Level (Lower Body Dressing) maximum assist (25% patient effort)   Toileting   [place] Level (Toileting) moderate assist (50% patient effort)   Clinical Impression   Criteria for Skilled Therapeutic Interventions Met (OT) Yes, treatment indicated   OT Diagnosis Decline function   OT Problem List-Impairments impacting ADL activity tolerance impaired;balance;pain;post-surgical precautions;mobility   Assessment of Occupational Performance 5 or more Performance Deficits   Identified Performance Deficits LB dressing, toileting, bathing, functional mobility, strenuous IADLs   Planned Therapy Interventions (OT) ADL retraining;IADL retraining;transfer training;home program guidelines;progressive activity/exercise   Clinical Decision Making Complexity (OT) low complexity   Anticipated Equipment Needs Upon Discharge (OT)   (long bath sponge. long shoe horn.)   Risk & Benefits of therapy have been explained evaluation/treatment results reviewed;care plan/treatment goals reviewed;risks/benefits reviewed;current/potential barriers reviewed;participants included;patient;participants voiced agreement with care plan;daughter   OT Discharge Planning   OT Rationale for DC Rec Defer to ortho. Pt currently requiring assist of 1 for bathing, LB dressing, toileting, bed mobility. Pt reports she cannot have this level of assist at home and is interested in TCU. Pt functioning below baseline and will benefit from continued skilled OT to maximize safety and independence.   Total Evaluation Time (Minutes)   Total Evaluation Time (Minutes) 6   OT  Goals   Therapy Frequency (OT) Daily   OT Predicted Duration/Target Date for Goal Attainment 04/17/22   OT Goals Lower Body Dressing;Transfers;Toilet Transfer/Toileting;OT Goal 1;OT Goal 2   OT: Lower Body Dressing Modified independent;within precautions;including set-up/clothing retrieval;using adaptive equipment   OT: Transfer Modified independent;with assistive device;within precautions  (tub transfer)   OT: Toilet Transfer/Toileting Modified independent;toilet transfer;cleaning and garment management;using adaptive equipment   OT: Goal 1 Pt will verbalize understanding of pain management strategies, how to progress activity tolerance, fall prevention strateiges   OT: Goal 2 Pt will verbalize understanding of vehicle transfer technique

## 2022-04-14 NOTE — CONSULTS
Care Management Initial Consult    General Information  Assessment completed with: Patient, pt - Radha and dtjanice Wagner  Type of CM/SW Visit: Initial Assessment    Primary Care Provider verified and updated as needed: Yes   Readmission within the last 30 days: no previous admission in last 30 days      Reason for Consult: discharge planning  Advance Care Planning: Advance Care Planning Reviewed: no concerns identified        Communication Assessment  Patient's communication style: spoken language (English or Bilingual)    Hearing Difficulty or Deaf: no   Wear Glasses or Blind: yes    Cognitive  Cognitive/Neuro/Behavioral: WDL  Level of Consciousness: alert  Arousal Level: opens eyes spontaneously  Orientation: oriented x 4             Living Environment:   People in home: spouse     Current living Arrangements: house      Able to return to prior arrangements: yes       Family/Social Support:  Care provided by: self  Provides care for: spouse  Marital Status:   Children          Description of Support System: Supportive, Involved    Support Assessment: Adequate family and caregiver support    Current Resources:   Patient receiving home care services: No     Community Resources: None  Equipment currently used at home: cane, quad, walker, rolling, grab bar, toilet, grab bar, tub/shower, tub bench, raised toilet seat  Supplies currently used at home: None    Employment/Financial:  Employment Status:          Financial Concerns: insurance, none   Referral to Financial Worker: No     Lifestyle & Psychosocial Needs:  Social Determinants of Health     Tobacco Use: Medium Risk     Smoking Tobacco Use: Former Smoker     Smokeless Tobacco Use: Never Used   Alcohol Use: Not on file   Financial Resource Strain: Not on file   Food Insecurity: Not on file   Transportation Needs: Not on file   Physical Activity: Not on file   Stress: Not on file   Social Connections: Not on file   Intimate Partner Violence: Not on file    Depression: Not on file   Housing Stability: Not on file     Functional Status:  Prior to admission patient needed assistance:   Dependent ADLs:: Ambulation-cane, Ambulation-walker  Dependent IADLs:: Independent     Mental Health Status:  Mental Health Status: No Current Concerns       Chemical Dependency Status:  Chemical Dependency Status: No Current Concerns           Values/Beliefs:  Spiritual, Cultural Beliefs, Advent Practices, Values that affect care: no           Additional Information:  Patient is 71 year old female who presented on 4/13/2022 for a right total hip with Dr. Wong. Chart reviewed. Appears per ortho PA note, pt is needing TCU. Met with patient this date to discuss discharge planning. Introduced self and social work role. Pt was accompanied by her dtr Kristy. Discussed TCU and pt is agreeable. Pt resides in [place], her dtr Kristy lives in [place] and pt's other children live in [place]. Discussed TCU facilities in those areas and pt/Kristy agreeable to referrals being sent to the following facilities:    - [facility])  - [facility]  - [facility] faxed referrals. Pt will require [facility] Medicare Advantage prior authorization prior to discharge.     Social work will continue to follow and assist with discharge planning as needed.    GARCIELA Rao, LSW  Inpatient Care Coordination  Ortho Unit, [facility]  [phone]    GRACIELA Rios

## 2022-04-14 NOTE — PROGRESS NOTES
Orthopedic Surgery  Radha Kevinlin  2022  Admit Date:  2022  POD 1 Day Post-Op  S/P Procedure(s):  Right posterior approach total hip arthroplasty    Patient resting comfortably in bed    Reports pain 4-10 at rest with increased pain and difficulty with ambulation   Tolerating oral intake.    No events overnight.   Denies chest pain or shortness of breath   Alert and orient to person, place, and time.    Vital Sign Ranges  Temperature Temp  Av.9  F (36.1  C)  Min: 96.8  F (36  C)  Max: 97  F (36.1  C)   Blood pressure Systolic (24hrs), Av , Min:122 , Max:155        Diastolic (24hrs), Av, Min:67, Max:84      Pulse Pulse  Av.3  Min: 62  Max: 87   Respirations Resp  Av.8  Min: 7  Max: 17   Pulse oximetry SpO2  Av.6 %  Min: 91 %  Max: 98 %       Breathing easily without audible wheeze  ERIC dressing is clean, dry, and intact. Minimal erythema of the surrounding skin.  Bilateral calves are soft, non-tender.  Right lower extremity is NVI.  5/5 df/pf/ehl    Labs:  No results for input(s): POTASSIUM in the last 50653 hours.  Recent Labs   Lab Test 22  0604   HGB 10.8*     No results for input(s): INR in the last 29938 hours.  No results for input(s): PLT in the last 15541 hours.    PACU x-rays demonstrate a well aligned right total hip arthroplasty without fracture or dislocation.    A/P: 70 yo female status post right total hip arthroplasty, posterior approach, for osteoarthritis with Dr. Wong. DOS: 2022    1.)  WBAT with Posterior hip precautions  2.)  Ancef x 24 hours  3.)  DVT prophylaxis SCDs with ASA EC 325mg PO qday x 6 weeks   4.)  Keep dressing c/d/i x 2 weeks, OK to shower  5.)  Hip abduction pillow  6.)  PACU x-rays        Disposition: Patient having difficulty with ambulation post-operatively secondary to pain. BMI of 43. Pt initially planned discharging home with assistance of , however per the patient he has CHF and uses a walker at baseline,  therefore he would not be able to provide the assistance she needs. Patient reaching out to friends/family for additional support. If no additional support available, patient will require TCU. Social work consult placed.        Javed Gee PA-C

## 2022-04-14 NOTE — PROGRESS NOTES
"    [facility]  Hospitalist Progress Note  Edward Cole MD 04/14/2022    Reason for Stay (Diagnosis): s/p MARY         Assessment and Plan:      Summary of Stay: Radha Oliver is a 71-year-old female with a history of hypertension, diabetes mellitus maintained on oral medication, and right hip osteoarthritis.  She was admitted 4/13 following elective right total hip arthroplasty.     1.  Right hip osteoarthritis, status post elective right total hip arthroplasty:    - Postoperative course and DVT prophylaxis per Orthopedic Surgery.     2.  Diabetes mellitus  - maintained on oral medication as an outpatient.    - Most recent A1c was less than 7.   - Metformin has been resumed.    - resume glipizide when oral intake improves further  - ISS prn    3.  Hypertension history:    - continue lisinopril    I updated daughter at bedside today    DVT Prophylaxis: Defer to primary service  Code Status: Full Code  Discharge Dispo: home vs TCU  Estimated Disch Date / # of Days until Disch: per primary team.  From IM perspective medically stable for discharge.  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  TCU being considered        Interval History (Subjective):      Has some pain with ambulating, o/w no complaints.                    Physical Exam:      Last Vital Signs:  /64   Pulse 62   Temp 97  F (36.1  C) (Temporal)   Resp 16   Ht 1.65 m (5' 4.96\")   Wt 117 kg (258 lb)   SpO2 97%   BMI 42.99 kg/m        Intake/Output Summary (Last 24 hours) at 4/14/2022 1304  Last data filed at 4/14/2022 0639  Gross per 24 hour   Intake 240 ml   Output 1100 ml   Net -860 ml       Constitutional: Awake, alert, cooperative, no apparent distress   Respiratory: Clear to auscultation bilaterally, no crackles or wheezing   Cardiovascular: Regular rate and rhythm, normal S1 and S2, and no murmur noted   Abdomen: Normal bowel sounds, soft, non-distended, " non-tender   Skin: No rashes, no cyanosis, dry to touch   Neuro: Alert and oriented x3, no weakness, numbness, memory loss   Extremities: No edema, normal range of motion   Other(s): dtr present in room today       All other systems: Negative          Medications:      All current medications were reviewed with changes reflected in problem list.         Data:      All new lab and imaging data was reviewed.   Labs:  Recent Labs   Lab 04/14/22  0604 04/14/22  0133 04/13/22  2203   * 139* 195*     Recent Labs   Lab 04/14/22  0604   HGB 10.8*      Imaging:   No results found for this or any previous visit (from the past 24 hour(s)).

## 2022-04-14 NOTE — PLAN OF CARE
Patient vital signs are at baseline: Yes  Patient able to ambulate as they were prior to admission or with assist devices provided by therapies during their stay:  Yes assist x1 GB Walker  Patient MUST void prior to discharge:  Yes to the bathroom  Patient able to tolerate oral intake:  Yes on regular diet  Pain has adequate pain control using Oral analgesics:  Yes with PRN Oxycodone 5 mg  Does patient have an identified :  Yes  Has goal D/C date and time been discussed with patient:  Yes     Pt is A&O x4. VS stable. CMS intact. IV SL. Hypertensive at the baseline. . ERIC drain. Discharge plan to a TCU.

## 2022-04-15 NOTE — PLAN OF CARE
Occupational Therapy Discharge Summary    Reason for therapy discharge:    Discharged to transitional care facility.    Progress towards therapy goal(s). See goals on Care Plan in [facility] electronic health record for goal details.  Goals partially met.  Barriers to achieving goals:   discharge from facility.    Therapy recommendation(s):    Continued therapy is recommended.  Rationale/Recommendations:  Defer to ortho. Pt currently requiring assist of 1 for bathing, LB dressing, toileting, bed mobility. Pt reports she cannot have this level of assist at home and is interested in TCU. Pt functioning below baseline and will benefit from continued skilled OT to maximize safety and independence..

## 2022-04-25 ENCOUNTER — DOCUMENTATION ONLY (OUTPATIENT)
Dept: OTHER | Facility: CLINIC | Age: 72
End: 2022-04-25
Payer: COMMERCIAL

## 2022-04-28 NOTE — DISCHARGE SUMMARY
Discharge Summary    Radha Oliver MRN# [id number]   YOB: 1950 Age: 71 year old     Date of Admission:  4/13/2022  Date of Discharge:  4/29/22  Admitting Physician:  Flaco Wong MD  Discharge Physician:  Flaco Wong MD     Primary Provider: John Perez [id number]          Admission Diagnoses:   Osteoarthritis right hip          Discharge Diagnosis:   Same           Surgical Procedure:   Total Hip arthroplasty           Discharge Disposition:     Discharged to short-term care facility           Medications Prior to Admission:     No medications prior to admission.             Discharge Medications:     Discharge Medication List as of 4/14/2022  3:18 PM      START taking these medications    Details   acetaminophen (TYLENOL) 325 MG tablet Take 2 tablets (650 mg) by mouth every 4 hours as needed for other (mild pain), Disp-100 tablet, R-0, E-Prescribe      aspirin (ASA) 325 MG EC tablet Take 1 tablet (325 mg) by mouth daily, Disp-42 tablet, R-0, E-Prescribe      hydrOXYzine (ATARAX) 10 MG tablet Take 1 tablet (10 mg) by mouth every 6 hours as needed for itching or anxiety (with pain, moderate pain), Disp-30 tablet, R-0, E-Prescribe      ondansetron (ZOFRAN-ODT) 4 MG ODT tab Take 1-2 tablets (4-8 mg) by mouth every 8 hours as needed for nausea Dissolve ON the tongue., Disp-10 tablet, R-0, E-Prescribe      senna-docusate (SENOKOT-S/PERICOLACE) 8.6-50 MG tablet Take 1-2 tablets by mouth 2 times daily Take while on oral narcotics to prevent or treat constipation., Disp-30 tablet, R-0, E-PrescribeWhile taking narcotics         CONTINUE these medications which have CHANGED    Details   oxyCODONE (ROXICODONE) 5 MG tablet Take 1-2 tablets (5-10 mg) by mouth every 4 hours as needed for pain (Moderate to Severe), Disp-20 tablet, R-0, Local Print      traMADol (ULTRAM) 50 MG tablet Take 1 tablet (50 mg) by mouth every 6 hours as needed for moderate to severe pain, Disp-30 tablet, R-0, Local Print          CONTINUE these medications which have NOT CHANGED    Details   ALPRAZolam (XANAX) 0.5 MG tablet Take 0.5 mg by mouth as needed for anxiety As needed for air travel, Historical      Calcium-Magnesium-Vitamin D (CALCIUM 1200+D3 PO) Take 1 tablet by mouth daily, Historical      celecoxib (CELEBREX) 200 MG capsule Take 200 mg by mouth every other day, Historical      cyanocobalamin (CYANOCOBALAMIN) 1000 MCG/ML injection Inject 1 mL into the muscle every 30 days, Historical      ferrous fumarate 65 mg, [place]. FE,-Vitamin C 125 mg (VITRON C)  MG TABS tablet Take 1 tablet by mouth daily, Historical      fish oil-omega-3 fatty acids 1000 MG capsule Take 2 g by mouth daily, Historical      FLUoxetine (PROZAC) 40 MG capsule Take 40 mg by mouth daily, Historical      gabapentin (NEURONTIN) 300 MG capsule Take 600 mg by mouth At Bedtime, Historical      glipiZIDE (GLUCOTROL XL) 5 MG 24 hr tablet Take 5 mg by mouth daily, Historical      lisinopril (ZESTRIL) 10 MG tablet Take 10 mg by mouth daily, Historical      magnesium oxide 200 MG TABS Take 1 tablet by mouth daily, Historical      metFORMIN (GLUCOPHAGE) 500 MG tablet Take 1,000 mg by mouth 2 times daily (with meals), Historical      multivitamin w/minerals (THERA-VIT-M) tablet Take 1 tablet by mouth daily, Historical      rosuvastatin (CRESTOR) 40 MG tablet Take 40 mg by mouth daily, Historical                   Consultations:     PT/OT  Social work: discharge planning and TCU placement   Hospital Medicine: Post-operative medical co-management of co-morbidities            Hospital Course:     The patient was admitted after the surgical procedure.The patient underwent an uneventful Total hip arthroplasty. Postoperatively, Aspirin was prescribed for DVT prophylaxis. Home medications have been reconciled. oxycodone 5 mg. was prescribed for pain.             Discharge Instructions and Follow-Up:          Discharge activity: WBAT with a walker   Discharge follow-up:  Follow-up with Dr. Wong in ~14 days post-op. [phone]   Outpatient therapy: Should already be arranged by office.  If not, patient should call to schedule 2x/week x 3 weeks.   Home Care agency: None   Supplies and equipment: None        Wound care: Leave dressing intact until your 2 week follow-up appointment. Okay to Shower.   Other instructions: Posterior hip precautions.  No hip flexion up past 90deg.  No ADduction of the surgical leg across the midline.     Javed Gee PA-C

## 2022-10-03 ENCOUNTER — HEALTH MAINTENANCE LETTER (OUTPATIENT)
Age: 72
End: 2022-10-03

## 2023-05-20 ENCOUNTER — HEALTH MAINTENANCE LETTER (OUTPATIENT)
Age: 73
End: 2023-05-20

## 2023-10-22 ENCOUNTER — HEALTH MAINTENANCE LETTER (OUTPATIENT)
Age: 73
End: 2023-10-22

## 2024-05-18 ENCOUNTER — HEALTH MAINTENANCE LETTER (OUTPATIENT)
Age: 74
End: 2024-05-18

## 2024-07-27 ENCOUNTER — HEALTH MAINTENANCE LETTER (OUTPATIENT)
Age: 74
End: 2024-07-27

## (undated) DEVICE — HOOD FLYTE W/PEELAWAY 408-800-100

## (undated) DEVICE — SU STRATAFIX 2-0 MH 36" SXPD2B412

## (undated) DEVICE — SET HANDPIECE INTERPULSE W/COAXIAL FAN SPRAY TIP 0210118000

## (undated) DEVICE — SOL WATER IRRIG 1000ML BOTTLE 2F7114

## (undated) DEVICE — DRAPE SPLIT SHEET 77X108 REINFORCED 29436

## (undated) DEVICE — LINEN FULL SHEET 5511

## (undated) DEVICE — BAG CLEAR TRASH 1.3M 39X33" P4040C

## (undated) DEVICE — DRAPE CONVERTORS U-DRAPE 60X72" 8476

## (undated) DEVICE — DRSG GAUZE 4X4" TRAY

## (undated) DEVICE — PREP CHLORAPREP 26ML TINTED HI-LITE ORANGE 930815

## (undated) DEVICE — PACK TOTAL HIP RIDGES LATEX PO15HIFSG

## (undated) DEVICE — SU VICRYL+ 1 MO-4 18" DYED VCP702D

## (undated) DEVICE — LINEN DRAPE 54X72" 5467

## (undated) DEVICE — SOL NACL 0.9% IRRIG 3000ML BAG 2B7477

## (undated) DEVICE — DRSG TEGADERM 2 3/8X2 3/4" 1624W

## (undated) DEVICE — Device

## (undated) DEVICE — LINEN ORTHO ACL PACK 5447

## (undated) DEVICE — GLOVE PROTEXIS POWDER FREE 7.5 ORTHOPEDIC 2D73ET75

## (undated) DEVICE — GLOVE PROTEXIS BLUE W/NEU-THERA 8.0  2D73EB80

## (undated) DEVICE — GLOVE PROTEXIS BLUE W/NEU-THERA 7.5  2D73EB75

## (undated) DEVICE — BLADE SAW SAGITTAL STRK 25X90X1.27MM HD SYS 6 6125-127-090

## (undated) DEVICE — DRAPE STERI TOWEL LG 1010

## (undated) DEVICE — SU ETHIBOND 5 V-37 4X30" MB66G

## (undated) DEVICE — DRAPE SPLIT EENT 76X124" 3X28" 9447

## (undated) DEVICE — SU VICRYL 2-0 CT-1 27" UND J259H

## (undated) DEVICE — DRSG TEGADERM 4X10" 1627

## (undated) DEVICE — SU VICRYL 0 CT-1 27" J340H

## (undated) DEVICE — ESU ELEC BLADE 6" COATED E1450-6

## (undated) DEVICE — GLOVE PROTEXIS POWDER FREE 8.0 ORTHOPEDIC 2D73ET80

## (undated) DEVICE — LINEN HALF SHEET 5512

## (undated) DEVICE — SUCTION MANIFOLD NEPTUNE 2 SYS 4 PORT 0702-020-000

## (undated) RX ORDER — FENTANYL CITRATE 50 UG/ML
INJECTION, SOLUTION INTRAMUSCULAR; INTRAVENOUS
Status: DISPENSED
Start: 2022-04-13

## (undated) RX ORDER — EPHEDRINE SULFATE 50 MG/ML
INJECTION, SOLUTION INTRAMUSCULAR; INTRAVENOUS; SUBCUTANEOUS
Status: DISPENSED
Start: 2022-04-13

## (undated) RX ORDER — TRANEXAMIC ACID 650 MG/1
TABLET ORAL
Status: DISPENSED
Start: 2022-04-13

## (undated) RX ORDER — ONDANSETRON 2 MG/ML
INJECTION INTRAMUSCULAR; INTRAVENOUS
Status: DISPENSED
Start: 2022-04-13

## (undated) RX ORDER — DEXAMETHASONE SODIUM PHOSPHATE 4 MG/ML
INJECTION, SOLUTION INTRA-ARTICULAR; INTRALESIONAL; INTRAMUSCULAR; INTRAVENOUS; SOFT TISSUE
Status: DISPENSED
Start: 2022-04-13

## (undated) RX ORDER — KETOROLAC TROMETHAMINE 30 MG/ML
INJECTION, SOLUTION INTRAMUSCULAR; INTRAVENOUS
Status: DISPENSED
Start: 2022-04-13

## (undated) RX ORDER — GLYCOPYRROLATE 0.2 MG/ML
INJECTION INTRAMUSCULAR; INTRAVENOUS
Status: DISPENSED
Start: 2022-04-13

## (undated) RX ORDER — VANCOMYCIN HYDROCHLORIDE 1 G/20ML
INJECTION, POWDER, LYOPHILIZED, FOR SOLUTION INTRAVENOUS
Status: DISPENSED
Start: 2022-04-13

## (undated) RX ORDER — PROPOFOL 10 MG/ML
INJECTION, EMULSION INTRAVENOUS
Status: DISPENSED
Start: 2022-04-13

## (undated) RX ORDER — CEFAZOLIN SODIUM/WATER 2 G/20 ML
SYRINGE (ML) INTRAVENOUS
Status: DISPENSED
Start: 2022-04-13

## (undated) RX ORDER — NEOSTIGMINE METHYLSULFATE 1 MG/ML
VIAL (ML) INJECTION
Status: DISPENSED
Start: 2022-04-13

## (undated) RX ORDER — LIDOCAINE HYDROCHLORIDE 10 MG/ML
INJECTION, SOLUTION EPIDURAL; INFILTRATION; INTRACAUDAL; PERINEURAL
Status: DISPENSED
Start: 2022-04-13